# Patient Record
Sex: FEMALE | Race: WHITE | NOT HISPANIC OR LATINO
[De-identification: names, ages, dates, MRNs, and addresses within clinical notes are randomized per-mention and may not be internally consistent; named-entity substitution may affect disease eponyms.]

---

## 2018-01-08 ENCOUNTER — LABORATORY RESULT (OUTPATIENT)
Age: 53
End: 2018-01-08

## 2018-01-09 ENCOUNTER — APPOINTMENT (OUTPATIENT)
Dept: OBGYN | Facility: CLINIC | Age: 53
End: 2018-01-09
Payer: MEDICAID

## 2018-01-09 ENCOUNTER — OUTPATIENT (OUTPATIENT)
Dept: OUTPATIENT SERVICES | Facility: HOSPITAL | Age: 53
LOS: 1 days | End: 2018-01-09
Payer: MEDICAID

## 2018-01-09 VITALS
WEIGHT: 170 LBS | DIASTOLIC BLOOD PRESSURE: 67 MMHG | BODY MASS INDEX: 30.12 KG/M2 | SYSTOLIC BLOOD PRESSURE: 100 MMHG | HEIGHT: 63 IN

## 2018-01-09 DIAGNOSIS — N76.0 ACUTE VAGINITIS: ICD-10-CM

## 2018-01-09 PROCEDURE — G0463: CPT

## 2018-01-09 PROCEDURE — 99396 PREV VISIT EST AGE 40-64: CPT | Mod: NC

## 2018-01-09 PROCEDURE — 87624 HPV HI-RISK TYP POOLED RSLT: CPT

## 2018-01-10 LAB
C TRACH RRNA SPEC QL NAA+PROBE: SIGNIFICANT CHANGE UP
HPV HIGH+LOW RISK DNA PNL CVX: SIGNIFICANT CHANGE UP
N GONORRHOEA RRNA SPEC QL NAA+PROBE: SIGNIFICANT CHANGE UP
SPECIMEN SOURCE: SIGNIFICANT CHANGE UP

## 2018-01-11 LAB — CYTOLOGY SPEC DOC CYTO: SIGNIFICANT CHANGE UP

## 2018-01-12 DIAGNOSIS — Z01.419 ENCOUNTER FOR GYNECOLOGICAL EXAMINATION (GENERAL) (ROUTINE) WITHOUT ABNORMAL FINDINGS: ICD-10-CM

## 2019-02-05 ENCOUNTER — APPOINTMENT (OUTPATIENT)
Dept: OBGYN | Facility: CLINIC | Age: 54
End: 2019-02-05
Payer: MEDICAID

## 2019-02-05 ENCOUNTER — OUTPATIENT (OUTPATIENT)
Dept: OUTPATIENT SERVICES | Facility: HOSPITAL | Age: 54
LOS: 1 days | End: 2019-02-05
Payer: MEDICAID

## 2019-02-05 VITALS
SYSTOLIC BLOOD PRESSURE: 125 MMHG | DIASTOLIC BLOOD PRESSURE: 81 MMHG | BODY MASS INDEX: 32.82 KG/M2 | WEIGHT: 185.25 LBS

## 2019-02-05 DIAGNOSIS — N76.0 ACUTE VAGINITIS: ICD-10-CM

## 2019-02-05 DIAGNOSIS — Z01.419 ENCOUNTER FOR GYNECOLOGICAL EXAMINATION (GENERAL) (ROUTINE) W/OUT ABNORMAL FINDINGS: ICD-10-CM

## 2019-02-05 PROCEDURE — G0463: CPT

## 2019-02-05 PROCEDURE — 99396 PREV VISIT EST AGE 40-64: CPT | Mod: NC

## 2019-02-05 NOTE — PROCEDURE
[Cervical Pap Smear] : cervical Pap smear [Tolerated Poorly] : the patient tolerated the procedure poorly [de-identified] : Pt could not tolerate speculum - sample not collected

## 2019-02-05 NOTE — HISTORY OF PRESENT ILLNESS
[Last Mammogram ___] : Last Mammogram was [unfilled] [Last Colonoscopy ___] : Last colonoscopy [unfilled] [Postmenopausal] : is postmenopausal [Post-Menopause, No Sxs] : post-menopausal, currently without symptoms [Menopause Age: ____] : age at menopause was [unfilled] [Sexually Active] : is sexually active [Monogamous] : is monogamous [Male ___] : [unfilled] male

## 2019-02-07 ENCOUNTER — APPOINTMENT (OUTPATIENT)
Dept: ULTRASOUND IMAGING | Facility: CLINIC | Age: 54
End: 2019-02-07

## 2019-02-08 DIAGNOSIS — Z01.419 ENCOUNTER FOR GYNECOLOGICAL EXAMINATION (GENERAL) (ROUTINE) WITHOUT ABNORMAL FINDINGS: ICD-10-CM

## 2019-02-11 ENCOUNTER — OUTPATIENT (OUTPATIENT)
Dept: OUTPATIENT SERVICES | Facility: HOSPITAL | Age: 54
LOS: 1 days | End: 2019-02-11
Payer: MEDICAID

## 2019-02-11 ENCOUNTER — APPOINTMENT (OUTPATIENT)
Dept: ULTRASOUND IMAGING | Facility: CLINIC | Age: 54
End: 2019-02-11
Payer: MEDICAID

## 2019-02-11 DIAGNOSIS — Z00.8 ENCOUNTER FOR OTHER GENERAL EXAMINATION: ICD-10-CM

## 2019-02-11 PROCEDURE — 76856 US EXAM PELVIC COMPLETE: CPT

## 2019-02-11 PROCEDURE — 76856 US EXAM PELVIC COMPLETE: CPT | Mod: 26

## 2019-02-20 ENCOUNTER — APPOINTMENT (OUTPATIENT)
Dept: PLASTIC SURGERY | Facility: CLINIC | Age: 54
End: 2019-02-20
Payer: MEDICAID

## 2019-02-20 DIAGNOSIS — M79.642 PAIN IN LEFT HAND: ICD-10-CM

## 2019-02-20 PROCEDURE — 99213 OFFICE O/P EST LOW 20 MIN: CPT

## 2019-02-23 NOTE — HISTORY OF PRESENT ILLNESS
[FreeTextEntry1] : Pt presents here today because patient has had left carpal tunnel syndrome for the last six years but has put off surgery because of having children.  She states she has had numbness, tingling and pain for the past six years but the last six months has been worse.  She is now ready to have the surgery.  Pt had right hand carpal tunnel release in 2/2012 and has had no problems with the right hand.

## 2019-02-23 NOTE — REASON FOR VISIT
[Initial Evaluation] : an initial evaluation [FreeTextEntry1] : Pt presents here for Consulation of LT  Hand Pain, Numbness, and Tingling Sensation. She has been feeling Pain for last 6 yrs, however it has been worse the last 6 mnths. Pt states she can't hold objects for more then 2 minutes. Pt states her pain is worse at night, has to wear a Splint for Relief. Pt states she is RT Hand Dominant, She states she has H/x of CT RT hand 02/2012 w/ Dr. Schmidt at [facility]. Pt states she has never done Steroid Injections, Therapy, or Imaging.  Pt is here to discuss Surgical/Treatment Options for LT hand.

## 2019-02-23 NOTE — REVIEW OF SYSTEMS
[Fever] : no fever [Chills] : no chills [Negative] : Heme/Lymph [FreeTextEntry9] : Left hand numbness, tingling and pain [de-identified] : left hand numbness

## 2019-02-23 NOTE — PHYSICAL EXAM
[NI] : Normal [de-identified] : Positive Tinel's and Phalen's at carpal tunnel left hand.  Decreased sensation in the median nerve distribution [de-identified] : as above [de-identified] : decreased sensation in median nerve distribution,

## 2019-05-10 ENCOUNTER — OUTPATIENT (OUTPATIENT)
Dept: OUTPATIENT SERVICES | Facility: HOSPITAL | Age: 54
LOS: 1 days | End: 2019-05-10
Payer: MEDICAID

## 2019-05-10 VITALS
SYSTOLIC BLOOD PRESSURE: 114 MMHG | RESPIRATION RATE: 18 BRPM | TEMPERATURE: 99 F | HEIGHT: 62 IN | WEIGHT: 192.02 LBS | OXYGEN SATURATION: 99 % | HEART RATE: 95 BPM | DIASTOLIC BLOOD PRESSURE: 77 MMHG

## 2019-05-10 DIAGNOSIS — G56.02 CARPAL TUNNEL SYNDROME, LEFT UPPER LIMB: ICD-10-CM

## 2019-05-10 DIAGNOSIS — Z01.818 ENCOUNTER FOR OTHER PREPROCEDURAL EXAMINATION: ICD-10-CM

## 2019-05-10 DIAGNOSIS — Z98.891 HISTORY OF UTERINE SCAR FROM PREVIOUS SURGERY: Chronic | ICD-10-CM

## 2019-05-10 PROCEDURE — 85027 COMPLETE CBC AUTOMATED: CPT

## 2019-05-10 PROCEDURE — G0463: CPT

## 2019-05-10 RX ORDER — LIDOCAINE HCL 20 MG/ML
0.2 VIAL (ML) INJECTION ONCE
Refills: 0 | Status: DISCONTINUED | OUTPATIENT
Start: 2019-05-17 | End: 2019-06-01

## 2019-05-10 RX ORDER — SODIUM CHLORIDE 9 MG/ML
3 INJECTION INTRAMUSCULAR; INTRAVENOUS; SUBCUTANEOUS EVERY 8 HOURS
Refills: 0 | Status: DISCONTINUED | OUTPATIENT
Start: 2019-05-17 | End: 2019-06-01

## 2019-05-10 NOTE — H&P PST ADULT - NSANTHOSAYNRD_GEN_A_CORE
No. WILLARD screening performed.  STOP BANG Legend: 0-2 = LOW Risk; 3-4 = INTERMEDIATE Risk; 5-8 = HIGH Risk

## 2019-05-10 NOTE — H&P PST ADULT - NSICDXPROBLEM_GEN_ALL_CORE_FT
PROBLEM DIAGNOSES  Problem: Left carpal tunnel syndrome  Assessment and Plan: coming in for left carpal tunnel release

## 2019-05-10 NOTE — H&P PST ADULT - DOES THIS PATIENT HAVE A HISTORY OF OR HAS BEEN DX WITH HEART FAILURE?
Active kidney transplant listing letter sent to  Patient  [facility]– [place]  PCP - Dr Webb  
no

## 2019-05-10 NOTE — H&P PST ADULT - NSICDXPASTMEDICALHX_GEN_ALL_CORE_FT
PAST MEDICAL HISTORY:  Anemia     Breech presentation     Carpal tunnel syndrome, bilateral     Gestational diabetes     Obesity

## 2019-05-16 ENCOUNTER — TRANSCRIPTION ENCOUNTER (OUTPATIENT)
Age: 54
End: 2019-05-16

## 2019-05-17 ENCOUNTER — OUTPATIENT (OUTPATIENT)
Dept: OUTPATIENT SERVICES | Facility: HOSPITAL | Age: 54
LOS: 1 days | End: 2019-05-17
Payer: MEDICAID

## 2019-05-17 VITALS
TEMPERATURE: 98 F | HEART RATE: 75 BPM | DIASTOLIC BLOOD PRESSURE: 60 MMHG | RESPIRATION RATE: 15 BRPM | SYSTOLIC BLOOD PRESSURE: 109 MMHG | OXYGEN SATURATION: 96 %

## 2019-05-17 VITALS
DIASTOLIC BLOOD PRESSURE: 74 MMHG | RESPIRATION RATE: 16 BRPM | TEMPERATURE: 98 F | HEART RATE: 76 BPM | OXYGEN SATURATION: 97 % | WEIGHT: 192.02 LBS | SYSTOLIC BLOOD PRESSURE: 119 MMHG | HEIGHT: 62 IN

## 2019-05-17 DIAGNOSIS — Z01.818 ENCOUNTER FOR OTHER PREPROCEDURAL EXAMINATION: ICD-10-CM

## 2019-05-17 DIAGNOSIS — Z98.891 HISTORY OF UTERINE SCAR FROM PREVIOUS SURGERY: Chronic | ICD-10-CM

## 2019-05-17 DIAGNOSIS — G56.02 CARPAL TUNNEL SYNDROME, LEFT UPPER LIMB: ICD-10-CM

## 2019-05-17 PROCEDURE — 64721 CARPAL TUNNEL SURGERY: CPT | Mod: LT

## 2019-05-17 PROCEDURE — 20526 THER INJECTION CARP TUNNEL: CPT | Mod: 59

## 2019-05-17 RX ORDER — ONDANSETRON 8 MG/1
4 TABLET, FILM COATED ORAL ONCE
Refills: 0 | Status: DISCONTINUED | OUTPATIENT
Start: 2019-05-17 | End: 2019-06-01

## 2019-05-17 RX ORDER — CELECOXIB 200 MG/1
200 CAPSULE ORAL ONCE
Refills: 0 | Status: DISCONTINUED | OUTPATIENT
Start: 2019-05-17 | End: 2019-06-01

## 2019-05-17 RX ORDER — SODIUM CHLORIDE 9 MG/ML
1000 INJECTION, SOLUTION INTRAVENOUS
Refills: 0 | Status: DISCONTINUED | OUTPATIENT
Start: 2019-05-17 | End: 2019-06-01

## 2019-05-17 RX ORDER — ACETAMINOPHEN 500 MG
1000 TABLET ORAL ONCE
Refills: 0 | Status: COMPLETED | OUTPATIENT
Start: 2019-05-17 | End: 2019-05-17

## 2019-05-17 RX ORDER — CELECOXIB 200 MG/1
200 CAPSULE ORAL ONCE
Refills: 0 | Status: COMPLETED | OUTPATIENT
Start: 2019-05-17 | End: 2019-05-17

## 2019-05-17 RX ORDER — OXYCODONE HYDROCHLORIDE 5 MG/1
5 TABLET ORAL ONCE
Refills: 0 | Status: DISCONTINUED | OUTPATIENT
Start: 2019-05-17 | End: 2019-05-17

## 2019-05-17 RX ADMIN — Medication 1000 MILLIGRAM(S): at 06:22

## 2019-05-17 RX ADMIN — CELECOXIB 200 MILLIGRAM(S): 200 CAPSULE ORAL at 06:22

## 2019-05-17 NOTE — ASU DISCHARGE PLAN (ADULT/PEDIATRIC) - NURSING INSTRUCTIONS
Next dose of Tylenol will be on or after __12:22 PM_ ,today/tonight, If needed for pain/cramps. Your first dose of Tylenol was given at __6:22 AM__. Do not exceed more than 4000mg of Tylenol in one 24 hour setting.

## 2019-05-17 NOTE — ASU DISCHARGE PLAN (ADULT/PEDIATRIC) - CALL YOUR DOCTOR IF YOU HAVE ANY OF THE FOLLOWING:
Wound/Surgical Site with redness, or foul smelling discharge or pus/Fever greater than (need to indicate Fahrenheit or Celsius)/Swelling that gets worse/Numbness, tingling, color or temperature change to extremity/Pain not relieved by Medications/Nausea and vomiting that does not stop/Bleeding that does not stop Nausea and vomiting that does not stop/Fever greater than (need to indicate Fahrenheit or Celsius)/Unable to urinate/Numbness, tingling, color or temperature change to extremity/Pain not relieved by Medications/Swelling that gets worse/Bleeding that does not stop/Wound/Surgical Site with redness, or foul smelling discharge or pus

## 2019-05-17 NOTE — ASU PATIENT PROFILE, ADULT - PMH
Anemia    Breech presentation    Carpal tunnel syndrome, bilateral    Gestational diabetes    Obesity

## 2019-05-17 NOTE — ASU DISCHARGE PLAN (ADULT/PEDIATRIC) - CARE PROVIDER_API CALL
Gabriel Schmidt)  Plastic Surgery  [address] [place] [address], [address]  [place] [address]  Phone: [phone]  Fax: [phone]  Follow Up Time: 2 weeks

## 2019-05-22 ENCOUNTER — APPOINTMENT (OUTPATIENT)
Dept: PLASTIC SURGERY | Facility: CLINIC | Age: 54
End: 2019-05-22
Payer: MEDICAID

## 2019-05-22 VITALS — HEIGHT: 60 IN

## 2019-05-22 PROCEDURE — 99024 POSTOP FOLLOW-UP VISIT: CPT

## 2019-05-22 NOTE — REASON FOR VISIT
[FreeTextEntry1] : DOS: 05/17/2019 s/p Left carpal tunnel release. Patient c/o mild pain and describes pain level 3/10.

## 2019-05-22 NOTE — PHYSICAL EXAM
[NI] : Normal [de-identified] : Left hand incision healing well no sign of infection no erythema no dehiscence

## 2019-05-22 NOTE — REVIEW OF SYSTEMS
[Fever] : no fever [Chills] : no chills [Negative] : Respiratory [FreeTextEntry9] : Left hand dressing in place

## 2019-06-03 ENCOUNTER — APPOINTMENT (OUTPATIENT)
Dept: PLASTIC SURGERY | Facility: CLINIC | Age: 54
End: 2019-06-03
Payer: MEDICAID

## 2019-06-03 PROCEDURE — 99024 POSTOP FOLLOW-UP VISIT: CPT

## 2019-06-17 ENCOUNTER — APPOINTMENT (OUTPATIENT)
Dept: PLASTIC SURGERY | Facility: CLINIC | Age: 54
End: 2019-06-17
Payer: MEDICAID

## 2019-06-17 DIAGNOSIS — G56.02 CARPAL TUNNEL SYNDROME, LEFT UPPER LIMB: ICD-10-CM

## 2019-06-17 PROCEDURE — 99024 POSTOP FOLLOW-UP VISIT: CPT

## 2019-06-19 NOTE — PHYSICAL EXAM
[NI] : Normal [de-identified] : Left hand incision healing well no sign of infection no erythema no dehiscence.  Sutures removed

## 2019-06-19 NOTE — REASON FOR VISIT
[Follow-Up: _____] : a [unfilled] follow-up visit [FreeTextEntry1] : DOS: 05/17/2019 s/p left carpal tunnel release. Patient is doing better with time.

## 2019-07-07 NOTE — REASON FOR VISIT
[FreeTextEntry1] :  DOS: 05/17/2019 s/p left carpal tunnel release. pt reports some pain/discomfort at times. pt is here for evaluation.

## 2019-07-07 NOTE — PHYSICAL EXAM
[NI] : Normal [de-identified] : Left hand incision healing well no sign of infection no erythema no dehiscence.

## 2020-09-09 DIAGNOSIS — Z00.00 ENCOUNTER FOR GENERAL ADULT MEDICAL EXAMINATION W/OUT ABNORMAL FINDINGS: ICD-10-CM

## 2020-09-30 ENCOUNTER — APPOINTMENT (OUTPATIENT)
Dept: ULTRASOUND IMAGING | Facility: CLINIC | Age: 55
End: 2020-09-30
Payer: MEDICAID

## 2020-09-30 ENCOUNTER — OUTPATIENT (OUTPATIENT)
Dept: OUTPATIENT SERVICES | Facility: HOSPITAL | Age: 55
LOS: 1 days | End: 2020-09-30
Payer: MEDICAID

## 2020-09-30 DIAGNOSIS — Z00.00 ENCOUNTER FOR GENERAL ADULT MEDICAL EXAMINATION WITHOUT ABNORMAL FINDINGS: ICD-10-CM

## 2020-09-30 DIAGNOSIS — Z98.891 HISTORY OF UTERINE SCAR FROM PREVIOUS SURGERY: Chronic | ICD-10-CM

## 2020-09-30 PROCEDURE — 76856 US EXAM PELVIC COMPLETE: CPT

## 2020-09-30 PROCEDURE — 76856 US EXAM PELVIC COMPLETE: CPT | Mod: 26

## 2020-10-22 NOTE — ASU PREOP CHECKLIST - WEIGHT IN LBS
81 yo F w/ a PMHx of HLD, seasonal affective disorder, ICH, pre-DM, and on spironolactone for hair growth, p/w left knee pain after a mechanical fall. Acute nondisplaced transverse fracture of left patella on CT knee. Admitted to [facility] for further management of care.
192

## 2021-09-25 ENCOUNTER — TRANSCRIPTION ENCOUNTER (OUTPATIENT)
Age: 56
End: 2021-09-25

## 2021-11-19 ENCOUNTER — APPOINTMENT (OUTPATIENT)
Dept: MAMMOGRAPHY | Facility: CLINIC | Age: 56
End: 2021-11-19
Payer: MEDICAID

## 2021-11-19 PROCEDURE — 77067 SCR MAMMO BI INCL CAD: CPT

## 2021-11-19 PROCEDURE — 77063 BREAST TOMOSYNTHESIS BI: CPT

## 2022-03-21 NOTE — HISTORY OF PRESENT ILLNESS
Pt updated on plan of care, no new needs at this time, will continue to monitor, pt requested lights dimmed, this RN dimmed lights.    [FreeTextEntry1] : Pt s/p left carpal tunnel release.  Pt doing well no complaints.  Pt states numbness and tingling are much better.

## 2022-05-24 NOTE — ASU DISCHARGE PLAN (ADULT/PEDIATRIC) - SIGNS AND SYMPTOMS OF INFECTION: FEVER, REDNESS, SWELLING, FOUL SMELLING DISCHARGE
Statement Selected Azathioprine Counseling:  I discussed with the patient the risks of azathioprine including but not limited to myelosuppression, immunosuppression, hepatotoxicity, lymphoma, and infections.  The patient understands that monitoring is required including baseline LFTs, Creatinine, possible TPMP genotyping and weekly CBCs for the first month and then every 2 weeks thereafter.  The patient verbalized understanding of the proper use and possible adverse effects of azathioprine.  All of the patient's questions and concerns were addressed.

## 2022-12-02 ENCOUNTER — APPOINTMENT (OUTPATIENT)
Dept: MAMMOGRAPHY | Facility: CLINIC | Age: 57
End: 2022-12-02

## 2022-12-02 PROCEDURE — 77063 BREAST TOMOSYNTHESIS BI: CPT

## 2022-12-02 PROCEDURE — 77067 SCR MAMMO BI INCL CAD: CPT

## 2023-02-10 ENCOUNTER — APPOINTMENT (OUTPATIENT)
Dept: ULTRASOUND IMAGING | Facility: CLINIC | Age: 58
End: 2023-02-10
Payer: MEDICAID

## 2023-02-10 ENCOUNTER — OUTPATIENT (OUTPATIENT)
Dept: OUTPATIENT SERVICES | Facility: HOSPITAL | Age: 58
LOS: 1 days | End: 2023-02-10
Payer: MEDICAID

## 2023-02-10 DIAGNOSIS — Z98.891 HISTORY OF UTERINE SCAR FROM PREVIOUS SURGERY: Chronic | ICD-10-CM

## 2023-02-10 DIAGNOSIS — E78.5 HYPERLIPIDEMIA, UNSPECIFIED: ICD-10-CM

## 2023-02-10 PROCEDURE — 76856 US EXAM PELVIC COMPLETE: CPT

## 2023-02-10 PROCEDURE — 76856 US EXAM PELVIC COMPLETE: CPT | Mod: 26

## 2023-02-12 ENCOUNTER — EMERGENCY (EMERGENCY)
Facility: HOSPITAL | Age: 58
LOS: 1 days | Discharge: HOME | End: 2023-02-12
Attending: EMERGENCY MEDICINE
Payer: MEDICAID

## 2023-02-12 VITALS
OXYGEN SATURATION: 98 % | RESPIRATION RATE: 20 BRPM | HEART RATE: 105 BPM | DIASTOLIC BLOOD PRESSURE: 88 MMHG | TEMPERATURE: 98 F | SYSTOLIC BLOOD PRESSURE: 142 MMHG

## 2023-02-12 VITALS
TEMPERATURE: 98 F | SYSTOLIC BLOOD PRESSURE: 161 MMHG | DIASTOLIC BLOOD PRESSURE: 80 MMHG | OXYGEN SATURATION: 99 % | HEIGHT: 62 IN | HEART RATE: 102 BPM | WEIGHT: 177.91 LBS | RESPIRATION RATE: 18 BRPM

## 2023-02-12 DIAGNOSIS — Z98.891 HISTORY OF UTERINE SCAR FROM PREVIOUS SURGERY: Chronic | ICD-10-CM

## 2023-02-12 PROCEDURE — 73562 X-RAY EXAM OF KNEE 3: CPT

## 2023-02-12 PROCEDURE — 73630 X-RAY EXAM OF FOOT: CPT

## 2023-02-12 PROCEDURE — 73590 X-RAY EXAM OF LOWER LEG: CPT | Mod: 26,LT

## 2023-02-12 PROCEDURE — 27788 TREATMENT OF ANKLE FRACTURE: CPT | Mod: LT

## 2023-02-12 PROCEDURE — 28475 CLTX METATARSAL FX W/MNPJ EA: CPT | Mod: LT

## 2023-02-12 PROCEDURE — 73562 X-RAY EXAM OF KNEE 3: CPT | Mod: 26,LT

## 2023-02-12 PROCEDURE — 73610 X-RAY EXAM OF ANKLE: CPT | Mod: 26,LT

## 2023-02-12 PROCEDURE — 99285 EMERGENCY DEPT VISIT HI MDM: CPT

## 2023-02-12 PROCEDURE — 73610 X-RAY EXAM OF ANKLE: CPT

## 2023-02-12 PROCEDURE — 73590 X-RAY EXAM OF LOWER LEG: CPT

## 2023-02-12 PROCEDURE — 99285 EMERGENCY DEPT VISIT HI MDM: CPT | Mod: 25

## 2023-02-12 PROCEDURE — 73610 X-RAY EXAM OF ANKLE: CPT | Mod: 26,LT,77

## 2023-02-12 PROCEDURE — 73630 X-RAY EXAM OF FOOT: CPT | Mod: 26,LT

## 2023-02-12 RX ORDER — ALPRAZOLAM 0.25 MG
0.5 TABLET ORAL ONCE
Refills: 0 | Status: DISCONTINUED | OUTPATIENT
Start: 2023-02-12 | End: 2023-02-12

## 2023-02-12 RX ORDER — OXYCODONE HYDROCHLORIDE 5 MG/1
1 TABLET ORAL
Qty: 9 | Refills: 0
Start: 2023-02-12 | End: 2023-02-14

## 2023-02-12 RX ORDER — OXYCODONE HYDROCHLORIDE 5 MG/1
5 TABLET ORAL ONCE
Refills: 0 | Status: DISCONTINUED | OUTPATIENT
Start: 2023-02-12 | End: 2023-02-12

## 2023-02-12 RX ADMIN — Medication 0.5 MILLIGRAM(S): at 17:37

## 2023-02-12 RX ADMIN — OXYCODONE HYDROCHLORIDE 5 MILLIGRAM(S): 5 TABLET ORAL at 15:50

## 2023-02-12 RX ADMIN — OXYCODONE HYDROCHLORIDE 5 MILLIGRAM(S): 5 TABLET ORAL at 14:34

## 2023-02-12 NOTE — ED PROVIDER NOTE - CLINICAL SUMMARY MEDICAL DECISION MAKING FREE TEXT BOX
57yoF w/traumatic ankle injury s/p fall off stepladder at home today. VS unremarkable, phys exam w/tenderness L ankle w/o knee or proximal tib/fib tenderness. XRs at urgent care showed "transverse fracture through the proximal diaphysis of the fifth metatarsus" and "transverse fracture through the lateral malleolus" of L foot, will repeat ankle/foot XR along w/tib-fib and knee to assess for maisonneuve fx, likely DCTH w/ortho f/u. - Tyler Dash, PGY-2

## 2023-02-12 NOTE — ED PROVIDER NOTE - CARE PLAN
Principal Discharge DX:	Ankle fracture   1 Principal Discharge DX:	Fracture of malleolus of left ankle

## 2023-02-12 NOTE — ED PROVIDER NOTE - ATTENDING CONTRIBUTION TO CARE
Attending MD Godfrey: I personally have seen and examined this patient.  Resident note reviewed and agree on plan of care and except where noted.  See below for details.     seen in [place] [facility], accompanied by     57F with no reported contributory PMH/PSH/Meds, no known drug allergies presents to the ED with L foot and ankle fracture diagnosed at Urgent Care.  Reports that this morning fell a few rungs off a ladder and broke fall with L ankle.  Report from XR reveals LEFT "transverse fracture through the proximal diaphysis of the fifth metatarsus" and "transverse fracture through the lateral malleolus".  Reports pain at L foot and ankle.  Denies other physical injury including chest pain, shortness of breath, abdominal pain, nausea, vomiting, diarrhea, urinary complaints. Denies preceding dizziness, weakness, sensory changes.  Denies LOC, hitting head.      Exam:   General: NAD  HENT: head NCAT, airway patent   Chest: symmetric chest rise, no increased work of breathing  MSK: ranging neck freely, able to move toes on L, +tenderness to L ankle, no tenderness to proximal lower leg, knee nontender, cap refill <2s, compartments soft  Neuro: moving all extremities spontaneously, sensory grossly intact, no gross neuro deficits  Psych: normal mood and affect     A/P: 57F with outpatient L foot and ankle fracture, will repeat films for concern for bony injury, will consult ortho and splint as needed

## 2023-02-12 NOTE — ED PROVIDER NOTE - NS ED ROS FT
GENERAL: No fever or chills, EYES: no change in vision, HEENT: no trouble swallowing or speaking, CARDIAC: no chest pain, PULMONARY: no cough or SOB, GI: no abdominal pain, no nausea, no vomiting, no diarrhea or constipation, : No changes in urination, SKIN: no rashes, NEURO: no headache,  MSK: +L ankle pain    All other ROS negative unless otherwise specified in HPI.     ~Tyler Dash M.D. Resident

## 2023-02-12 NOTE — ED PROVIDER NOTE - CARE PROVIDER_API CALL
Henry Hodges)  Orthopedics  [address] [place] [address]  Phone: [phone]  Fax: [phone]  Follow Up Time: 4-6 Days

## 2023-02-12 NOTE — ED ADULT NURSE NOTE - OBJECTIVE STATEMENT
58 y/o F with no significant PMHx presents to the ED with complaints of L foot/ankle pain s/p fall. Patient states she was going up a ladder and fell coming down as she had multiple things in her hands. Patient states she felt pain to foot/ankle 10/10 in severity following fall and was unable to get up immediately. Notes she was on the ground for approximately 15 minutes before getting up. Patient was seen at urgent care at which point recommended ED evaluation due to fracture. Patient denies fever, chills, headache, dizziness. AOx4 and speaking coherently with  at bedside. Breathing is unlabored, spontaneous, and symmetrical. Abdomen and bladder are nondistended. No peripheral edema. <2s capillary refill. Limited ROM of LLE secondary to pain.

## 2023-02-12 NOTE — ED PROVIDER NOTE - PHYSICAL EXAMINATION
Gen: AAOx3, non-toxic WDWN middle-aged woman lying in bed in NAD  Head: NCAT  HEENT: EOMI, oral mucosa moist, normal conjunctiva  Lung: CTAB, no respiratory distress, no wheezes/rhonchi/rales B/L, speaking in full sentences  CV: RRR, no murmurs, rubs or gallops  Abd: soft, NTND, no guarding, no CVA tenderness  MSK: +L ankle ttp through splint, good cap refill <2 sec, able to range toes, sensation intact; no L tib/fib/knee   Neuro: No focal sensory or motor deficits  Skin: Warm, well perfused, no rash, no edema  Psych: anxious, normal affect.  ~Tyler Dash M.D. Resident

## 2023-02-12 NOTE — ED ADULT NURSE REASSESSMENT NOTE - NS ED NURSE REASSESS COMMENT FT1
Patient notes "pain is ok if they leave me alone." States pain 7/10 in severity with movement. Foot/ankle wrapped and elevated.

## 2023-02-12 NOTE — ED PROVIDER NOTE - PROGRESS NOTE DETAILS
Ortho consulted, pt splinted in ED by ortho, recommend outpt f/u w/[facility], crutches as pt has to be nonweightbearing. - Tyler Dash, PGY-2 Attending MD Godfrey: Seen and splinted by ortho.  Patient is to be non weight bearing.  Pending repeat XR. Post-reduction XR OK for DCTH per ortho. crutches and crutch training completed. Pt stable, requesting discharge. Discussed results of workup, importance of follow-up with ortho, otc meds and oxy for pain, and return precautions with patient who verbalized understanding and agreement. Pt had opportunity to ask questions and address concerns, and results of workup including imaging were provided in DC paperwork. Patient is medically clear for DC at this time. -Tyler Dash, PGY-2

## 2023-02-12 NOTE — ED PROVIDER NOTE - NSFOLLOWUPINSTRUCTIONS_ED_ALL_ED_FT
Please follow up with an orthopedic surgeon within 1 week regarding your ANKLE FRACTURE. Bring copies of your results with you (provided in your discharge paperwork). DO NOT BEAR ANY WEIGHT ON YOUR ANKLE. USE CRUTCHES UNTIL TOLD OTHERWISE BY YOUR ORTHOPEDIC DOCTOR.    You may take 500-1000 mg acetaminophen (tylenol) every 6 hours, as needed for pain.  You may take 600 mg ibuprofen every 8 hours, with food, as needed for pain.  You can take tylenol and ibuprofen at the same time.     WHAT YOU NEED TO KNOW:  An ankle fracture is a break in 1 or more of the bones in your ankle.    DISCHARGE INSTRUCTIONS:    Call your local emergency number (911 in the [place]) for any of the following:   •You feel lightheaded, short of breath, and have chest pain.  •You cough up blood.    Return to the emergency department if:   •Your leg feels warm, tender, and painful. It may look swollen and red.  •Blood soaks through your bandage.  •You have severe pain in your ankle.  •Your cast feels too tight.  •Your foot or toes are cold or numb.  •Your foot or toenails turn blue or gray.    Call your doctor if:   •Your splint feels too tight.  •Your swelling has increased or returned.  •You have a fever.  •Your pain does not go away, even after treatment.  •You have questions or concerns about your condition or care.    Medicines: You may need any of the following:   •Acetaminophen decreases pain and fever. It is available without a doctor's order. Ask how much to take and how often to take it. Follow directions. Read the labels of all other medicines you are using to see if they also contain acetaminophen, or ask your doctor or pharmacist. Acetaminophen can cause liver damage if not taken correctly.  •NSAIDs, such as ibuprofen, help decrease swelling, pain, and fever. This medicine is available with or without a doctor's order. NSAIDs can cause stomach bleeding or kidney problems in certain people. If you take blood thinner medicine, always ask your healthcare provider if NSAIDs are safe for you. Always read the medicine label and follow directions.  •Prescription pain medicine may be given. Ask your healthcare provider how to take this medicine safely. Some prescription pain medicines contain acetaminophen. Do not take other medicines that contain acetaminophen without talking to your healthcare provider. Too much acetaminophen may cause liver damage. Prescription pain medicine may cause constipation. Ask your healthcare provider how to prevent or treat constipation.   •Take your medicine as directed. Contact your healthcare provider if you think your medicine is not helping or if you have side effects. Tell your provider if you are allergic to any medicine. Keep a list of the medicines, vitamins, and herbs you take. Include the amounts, and when and why you take them. Bring the list or the pill bottles to follow-up visits. Carry your medicine list with you in case of an emergency.    Self-care:   •Rest your ankle so that it can heal. Return to normal activities as directed.  •Apply ice on your ankle for 15 to 20 minutes every hour or as directed. Use an ice pack, or put crushed ice in a plastic bag. Cover it with a towel. Ice helps prevent tissue damage and decreases swelling and pain.  •Use a support device, such as a brace, cast, or splint to limit your movement and protect your ankle. You may need to use crutches to protect your ankle and decrease your pain as you move around. Do not remove your device and do not put weight on your injured ankle.  •Elevate your ankle above the level of your heart as often as you can. This will help decrease swelling and pain. Prop your ankle on pillows or blankets to keep it elevated comfortably.    Splint and cast care: Cover the splint or cast before you bathe so it does not get wet. Tape 2 plastic trash bags to your skin above the cast. Try to keep your ankle out of the water as much as possible. DO NOT GET YOUR SPLINT WET.     Follow up with your doctor in 1 to 2 days, or as directed: Your fracture may need to be reduced (bones pushed back into place) or you may need surgery. Write down your questions so you remember to ask them during your visits. Please follow up with an orthopedic surgeon within 1 week regarding your ANKLE FRACTURE. Bring copies of your results with you (provided in your discharge paperwork). DO NOT BEAR ANY WEIGHT ON YOUR ANKLE. USE CRUTCHES UNTIL TOLD OTHERWISE BY YOUR ORTHOPEDIC DOCTOR.    YOU ARE BEING GIVEN THE CONTACT INFORMATION FOR DR. SCHMID PLEASE CALL TO SET UP AN APPOINTMENT WITH HIM OR WITH YOUR ORTHOPEDIST OF CHOICE WITHIN A WEEK.      AGAIN, DO NOT WEIGHT BEAR (DO NOT PUT ANY WEIGHT ON YOUR LEFT FOOT OR ANKLE) UNTIL YOU ARE INSTRUCTED TO DO SO BY AN ORTHOPEDIST.    You may take 500-1000 mg acetaminophen (tylenol) every 6 hours, as needed for pain.  You may take 600 mg ibuprofen every 8 hours, with food, as needed for pain.  You can take tylenol and ibuprofen at the same time.     WHAT YOU NEED TO KNOW:  An ankle fracture is a break in 1 or more of the bones in your ankle.    DISCHARGE INSTRUCTIONS:    Call your local emergency number (911 in the [place]) for any of the following:   •You feel lightheaded, short of breath, and have chest pain.  •You cough up blood.    Return to the emergency department if:   •Your leg feels warm, tender, and painful. It may look swollen and red.  •Blood soaks through your bandage.  •You have severe pain in your ankle.  •Your cast feels too tight.  •Your foot or toes are cold or numb.  •Your foot or toenails turn blue or gray.    Call your doctor if:   •Your splint feels too tight.  •Your swelling has increased or returned.  •You have a fever.  •Your pain does not go away, even after treatment.  •You have questions or concerns about your condition or care.    Medicines: You may need any of the following:   •Acetaminophen decreases pain and fever. It is available without a doctor's order. Ask how much to take and how often to take it. Follow directions. Read the labels of all other medicines you are using to see if they also contain acetaminophen, or ask your doctor or pharmacist. Acetaminophen can cause liver damage if not taken correctly.  •NSAIDs, such as ibuprofen, help decrease swelling, pain, and fever. This medicine is available with or without a doctor's order. NSAIDs can cause stomach bleeding or kidney problems in certain people. If you take blood thinner medicine, always ask your healthcare provider if NSAIDs are safe for you. Always read the medicine label and follow directions.  •Prescription pain medicine may be given. Ask your healthcare provider how to take this medicine safely. Some prescription pain medicines contain acetaminophen. Do not take other medicines that contain acetaminophen without talking to your healthcare provider. Too much acetaminophen may cause liver damage. Prescription pain medicine may cause constipation. Ask your healthcare provider how to prevent or treat constipation.   •Take your medicine as directed. Contact your healthcare provider if you think your medicine is not helping or if you have side effects. Tell your provider if you are allergic to any medicine. Keep a list of the medicines, vitamins, and herbs you take. Include the amounts, and when and why you take them. Bring the list or the pill bottles to follow-up visits. Carry your medicine list with you in case of an emergency.    Self-care:   •Rest your ankle so that it can heal. Return to normal activities as directed.  •Apply ice on your ankle for 15 to 20 minutes every hour or as directed. Use an ice pack, or put crushed ice in a plastic bag. Cover it with a towel. Ice helps prevent tissue damage and decreases swelling and pain.  •Use a support device, such as a brace, cast, or splint to limit your movement and protect your ankle. You may need to use crutches to protect your ankle and decrease your pain as you move around. Do not remove your device and do not put weight on your injured ankle.  •Elevate your ankle above the level of your heart as often as you can. This will help decrease swelling and pain. Prop your ankle on pillows or blankets to keep it elevated comfortably.    Splint and cast care: Cover the splint or cast before you bathe so it does not get wet. Tape 2 plastic trash bags to your skin above the cast. Try to keep your ankle out of the water as much as possible. DO NOT GET YOUR SPLINT WET.     Follow up with your doctor in 1 to 2 days, or as directed: Your fracture may need to be reduced (bones pushed back into place) or you may need surgery. Write down your questions so you remember to ask them during your visits. Please follow up with an orthopedic surgeon within 1 week regarding your ANKLE FRACTURE. Bring copies of your results with you (provided in your discharge paperwork). DO NOT BEAR ANY WEIGHT ON YOUR ANKLE. USE CRUTCHES UNTIL TOLD OTHERWISE BY YOUR ORTHOPEDIC DOCTOR.    YOU ARE BEING GIVEN THE CONTACT INFORMATION FOR DR. SCHMID PLEASE CALL TO SET UP AN APPOINTMENT WITH HIM OR WITH YOUR ORTHOPEDIST OF CHOICE WITHIN A WEEK.      AGAIN, DO NOT WEIGHT BEAR (DO NOT PUT ANY WEIGHT ON YOUR LEFT FOOT OR ANKLE) UNTIL YOU ARE INSTRUCTED TO DO SO BY AN ORTHOPEDIST.    You may take 500-1000 mg acetaminophen (tylenol) every 6 hours, as needed for pain.  You may take 600 mg ibuprofen every 8 hours, with food, as needed for pain.  You can take tylenol and ibuprofen at the same time.     IF YOUR PAIN DOES NOT RESPOND TO TYLENOL/IBUPROFEN, PLEASE TAKE THE FOLLOWING MEDICATION AS PRESCRIBED: oxycodone, 1 tab, every 8 hours, AS NEEDED for severe pain. This is an opiate (narcotic) medication. Please do not drink alcohol, drive, or operate heavy machinery while taking this medication.     WHAT YOU NEED TO KNOW:  An ankle fracture is a break in 1 or more of the bones in your ankle.    DISCHARGE INSTRUCTIONS:    Call your local emergency number (911 in the [place]) for any of the following:   •You feel lightheaded, short of breath, and have chest pain.  •You cough up blood.    Return to the emergency department if:   •Your leg feels warm, tender, and painful. It may look swollen and red.  •Blood soaks through your bandage.  •You have severe pain in your ankle.  •Your cast feels too tight.  •Your foot or toes are cold or numb.  •Your foot or toenails turn blue or gray.    Call your doctor if:   •Your splint feels too tight.  •Your swelling has increased or returned.  •You have a fever.  •Your pain does not go away, even after treatment.  •You have questions or concerns about your condition or care.    Medicines: You may need any of the following:   •Acetaminophen decreases pain and fever. It is available without a doctor's order. Ask how much to take and how often to take it. Follow directions. Read the labels of all other medicines you are using to see if they also contain acetaminophen, or ask your doctor or pharmacist. Acetaminophen can cause liver damage if not taken correctly.  •NSAIDs, such as ibuprofen, help decrease swelling, pain, and fever. This medicine is available with or without a doctor's order. NSAIDs can cause stomach bleeding or kidney problems in certain people. If you take blood thinner medicine, always ask your healthcare provider if NSAIDs are safe for you. Always read the medicine label and follow directions.  •Prescription pain medicine may be given. Ask your healthcare provider how to take this medicine safely. Some prescription pain medicines contain acetaminophen. Do not take other medicines that contain acetaminophen without talking to your healthcare provider. Too much acetaminophen may cause liver damage. Prescription pain medicine may cause constipation. Ask your healthcare provider how to prevent or treat constipation.   •Take your medicine as directed. Contact your healthcare provider if you think your medicine is not helping or if you have side effects. Tell your provider if you are allergic to any medicine. Keep a list of the medicines, vitamins, and herbs you take. Include the amounts, and when and why you take them. Bring the list or the pill bottles to follow-up visits. Carry your medicine list with you in case of an emergency.    Self-care:   •Rest your ankle so that it can heal. Return to normal activities as directed.  •Apply ice on your ankle for 15 to 20 minutes every hour or as directed. Use an ice pack, or put crushed ice in a plastic bag. Cover it with a towel. Ice helps prevent tissue damage and decreases swelling and pain.  •Use a support device, such as a brace, cast, or splint to limit your movement and protect your ankle. You may need to use crutches to protect your ankle and decrease your pain as you move around. Do not remove your device and do not put weight on your injured ankle.  •Elevate your ankle above the level of your heart as often as you can. This will help decrease swelling and pain. Prop your ankle on pillows or blankets to keep it elevated comfortably.    Splint and cast care: Cover the splint or cast before you bathe so it does not get wet. Tape 2 plastic trash bags to your skin above the cast. Try to keep your ankle out of the water as much as possible. DO NOT GET YOUR SPLINT WET.     Follow up with your doctor in 1 to 2 days, or as directed: Your fracture may need to be reduced (bones pushed back into place) or you may need surgery. Write down your questions so you remember to ask them during your visits.

## 2023-02-12 NOTE — ED PROVIDER NOTE - PATIENT PORTAL LINK FT
You can access the FollowMyHealth Patient Portal offered by [facility] by registering at the following website: [email or web address]. By joining [facility]’s FollowMyHealth portal, you will also be able to view your health information using other applications (apps) compatible with our system.

## 2023-02-12 NOTE — CONSULT NOTE ADULT - SUBJECTIVE AND OBJECTIVE BOX
57y Female presents with left 5th metatarsal fracture and michelle A ankle fracture. S/p fall today. Denies numbness/tingling in the affected extremity. Denies head strike/LOC/other orthopedic injuries at this time. ambulates without assistance at baseline.    PAST MEDICAL & SURGICAL HISTORY:  Anemia      Carpal tunnel syndrome, bilateral      Obesity      Breech presentation      Gestational diabetes      S/P carpal tunnel release  right hand       History of   2013        Home Medications:    Allergies    No Known Allergies    Intolerances                        Vital Signs Last 24 Hrs  T(C): 36.3 (2023 14:38), Max: 36.8 (2023 12:45)  T(F): 97.4 (2023 14:38), Max: 98.2 (2023 12:45)  HR: 91 (2023 14:38) (91 - 102)  BP: 135/83 (2023 14:38) (135/83 - 161/80)  BP(mean): --  RR: 18 (2023 14:38) (18 - 18)  SpO2: 96% (2023 14:38) (96% - 99%)    Parameters below as of 2023 14:38  Patient On (Oxygen Delivery Method): room air        PHYSICAL EXAM  General: NAD, Awake and Alert      LLE:  skin intact  TTP about 5th met and lateral mal  NTTP about medial mal  NTTP proximal to ankle  +sensation L2-S1  +dorsiflexion/plantarflexion of ankle/hallux  +dorsalis pedis pulse  Soft compartments, - calf tenderness      Secondary Exam: Benign, Skin intact, NTTP along axial spine, SILT throughout, motor grossly intact throughout, no other orthopedic injuries at this time, compartments soft and compressible    ankle stress performed no gapping of medial mal clear space and no pain with stress      Procedure:  Procedure explained and discussed risks, benefits, and alternatives to procedure with patient at bedside. Patient expressed understanding and all questions were answered. Closed reduction was performed and a well molded plaster splint was applied. The patient tolerated the procedure well and there we no complications. The patient was neurovascularly intact following reduction. Post-reduction x-rays demonstrated acceptable alignment.      IMAGING:  XR : 5th met zone 2 jorgensen fx, michelle A ankle fx  CT :    Assessment/Plan:  57y Female with 5th met zone 2 jorgensen fx, michelle A ankle fx    -Placed in trilam AO ankle splint  -NWB LLE  -crutches for ambulation   -Pain control as needed  -f/u outpatient with Dr. monet in 10 days  -Keep splint clean dry intact   -explained may need ORIF of 5th met if no healing with non-op mgmt   -No acute orthopedic surgical intervention needed at this time  -Discussed with attending who agrees with plan  -Ortho stable for discharge

## 2023-02-12 NOTE — ED PROVIDER NOTE - OBJECTIVE STATEMENT
Pt is a 57yoF w/no PMHx p/w acute ankle fracture. Pt was on stepladder at home holding boxes when her skirt got caught, causing her to fall off ladder, broke fall w/L ankle, was unable to bear weight on ankle so she went to urgent care. XRs at urgent care showed "transverse fracture through the proximal diaphysis of the fifth metatarsus" and "transverse fracture through the lateral malleolus" of L foot, sent here to see "foot surgeon." Denies HA, SOB, CP, back pain, abd pain, n/v/d/c, numbness/tingling, generalized weakness, fever/chills.

## 2023-02-12 NOTE — ED ADULT NURSE NOTE - NSIMPLEMENTINTERV_GEN_ALL_ED
Implemented All Fall Risk Interventions:  [place] to call system. Call bell, personal items and telephone within reach. Instruct patient to call for assistance. Room bathroom lighting operational. Non-slip footwear when patient is off stretcher. Physically safe environment: no spills, clutter or unnecessary equipment. Stretcher in lowest position, wheels locked, appropriate side rails in place. Provide visual cue, wrist band, yellow gown, etc. Monitor gait and stability. Monitor for mental status changes and reorient to person, place, and time. Review medications for side effects contributing to fall risk. Reinforce activity limits and safety measures with patient and family.

## 2023-02-16 ENCOUNTER — NON-APPOINTMENT (OUTPATIENT)
Age: 58
End: 2023-02-16

## 2023-02-17 ENCOUNTER — NON-APPOINTMENT (OUTPATIENT)
Age: 58
End: 2023-02-17

## 2023-02-17 ENCOUNTER — APPOINTMENT (OUTPATIENT)
Dept: ORTHOPEDIC SURGERY | Facility: CLINIC | Age: 58
End: 2023-02-17
Payer: MEDICAID

## 2023-02-17 VITALS
HEIGHT: 62 IN | SYSTOLIC BLOOD PRESSURE: 140 MMHG | TEMPERATURE: 97.2 F | DIASTOLIC BLOOD PRESSURE: 80 MMHG | HEART RATE: 93 BPM | BODY MASS INDEX: 32.76 KG/M2 | OXYGEN SATURATION: 98 % | WEIGHT: 178 LBS

## 2023-02-17 DIAGNOSIS — S99.929A UNSPECIFIED INJURY OF UNSPECIFIED FOOT, INITIAL ENCOUNTER: ICD-10-CM

## 2023-02-17 PROCEDURE — 28470 CLTX METATARSAL FX WO MNP EA: CPT | Mod: LT

## 2023-02-17 PROCEDURE — 73630 X-RAY EXAM OF FOOT: CPT | Mod: LT

## 2023-02-17 PROCEDURE — 99203 OFFICE O/P NEW LOW 30 MIN: CPT

## 2023-02-17 PROCEDURE — 73610 X-RAY EXAM OF ANKLE: CPT | Mod: LT

## 2023-02-17 NOTE — REASON FOR VISIT
Instructions  Change your dressing every day.    Follow up in wound clinic in 3-4 weeks.     If you have questions or concerns about your wounds/wound care, please call the Wound Clinic at [phone].    1. Wash your hands with liquid soap and water or hand .  2. Set up the supplies needed for your wound care and dressing change on a clean surface.  3. Remove old/soiled dressing and discard, touching only the corners or top of the dressing, avoid touching the inside of the dressing.  4. Removal all wound packing and dressings to prevent an infection.  5. Repeat washing hands with liquid soap or hand .  6. Wash your wound with water using a clean washcloth, paper towel or gauze; moisten with water clean wound by wiping over the area. Use another clean paper towel, gauze or area of clean washcloth, clean area around the wound.    If you are able to shower, remove old dressing prior to showering. Cleanse your wound with water while you are in the shower.    WOUND TREATMENT  Apply the following to your wound bed Silvasorb and Mupirocin antibiotic ointment.    Cover/wrap with gauze, bandage, tape, compression stocking.      Call your healthcare provider immediately for the following symptoms:    · Shaking chills or fever greater than 100.4 degrees Farenheit  · Bleeding that soaks the dressing  · Stitches that are pulling away from the wound or pulling apart  · Increased drainage from the wound or drainage that is yellow-green, or smelly  · Increased swelling, pain, or redness in the skin around the wound  · Increased size of the wound  · Increased fatigue  · Loss of appetite     [Initial Visit] : an initial visit for [FreeTextEntry2] : left foot and ankle injuries

## 2023-02-17 NOTE — PHYSICAL EXAM
[Slightly Antalgic] : slightly antalgic [Wheelchair] : uses a wheelchair [LE] : Sensory: Intact in bilateral lower extremities [DP] : dorsalis pedis 2+ and symmetric bilaterally [PT] : posterior tibial 2+ and symmetric bilaterally [Normal] : Alert and in no acute distress [Poor Appearance] : well-appearing [Acute Distress] : not in acute distress [de-identified] : The patient has no respiratory distress. Mood and affect are normal. The patient is alert and oriented to person, place and time.\par There is no pain with knee motion.  Examination of the left foot and ankle demonstrates no tenderness of the ankle.  There is tenderness and swelling on the lateral border of the left midfoot.  The skin is intact.  There is mild ecchymosis.  Sensation is intact. [de-identified] : \par ACC: [id number] EXAM: XR KNEE AP LAT OBL 3 VIEWS LT ORDERED BY: THOR VARGHESE\par \par ACC: [id number] EXAM: XR TIB FIB AP LAT 2 VIEWS LT ORDERED BY: THOR VARGHESE\par \par PROCEDURE DATE: 02/12/2023\par INTERPRETATION: INDICATION: Trauma to the left lower extremity.\par \par TECHNIQUE: AP, oblique, and lateral views of the left knee and AP and lateral views left tibia and fibula\par \par FINDINGS: No fracture or dislocation is seen in the left knee. There is moderate patellofemoral arthrosis. There is no joint effusion in the left knee or prepatellar soft tissue swelling.\par \par There are small calcifications abutting the distal aspect of the left medial and lateral malleoli which may represent small avulsion fractures. Otherwise, no other fracture is seen in the left tibia and fibula.\par \par IMPRESSION: Mild osteoarthrosis of the left knee. No fracture or dislocation of the left knee.\par Questionable small avulsion fractures of the left medial and lateral malleoli. No other fracture is seen in the left tibia and fibula.\par \par --- End of Report ---\par \par ASHLYN HERNÁNDEZ MD; Attending Radiologist\par This document has been electronically signed. Feb 12 2023 2:31PM\par \par \par AP, lateral and mortise x-rays of the left ankle taken today demonstrate no fracture or dislocation.\par There is a small calcification at the tip of the lateral malleolus but there is no tenderness on exam.\par AP, lateral and oblique x-rays of the left foot demonstrate proximal fifth metatarsal fracture at the diaphyseal metaphyseal junction.

## 2023-02-17 NOTE — DISCUSSION/SUMMARY
[de-identified] : The patient has a fracture of the left fifth metatarsal in the metaphyseal diaphyseal junction.  I think that this fracture would be best immobilized in a short leg cast.  I had a lengthy discussion with the patient and her  who do not feel that she can tolerate nonweightbearing in a cast.  Alternatively she will be placed in a cam boot.  I have encouraged her to keep the boot on the vast majority of the day and to not ambulate on her foot.  If she needs to ambulate it must be done with the cam boot in place.  She understands that there may be a greater chance of delayed or nonunion without the cast immobilization.  She will be reevaluated in 3 weeks.

## 2023-03-13 ENCOUNTER — APPOINTMENT (OUTPATIENT)
Dept: ORTHOPEDIC SURGERY | Facility: CLINIC | Age: 58
End: 2023-03-13
Payer: MEDICAID

## 2023-03-13 VITALS
HEIGHT: 62 IN | BODY MASS INDEX: 32.76 KG/M2 | SYSTOLIC BLOOD PRESSURE: 138 MMHG | OXYGEN SATURATION: 98 % | HEART RATE: 102 BPM | DIASTOLIC BLOOD PRESSURE: 81 MMHG | TEMPERATURE: 97.7 F | WEIGHT: 178 LBS

## 2023-03-13 PROCEDURE — 73630 X-RAY EXAM OF FOOT: CPT | Mod: LT

## 2023-03-13 PROCEDURE — 99024 POSTOP FOLLOW-UP VISIT: CPT

## 2023-03-13 NOTE — HISTORY OF PRESENT ILLNESS
[de-identified] : The patient presents for reevaluation of left fifth metatarsal fracture, 4 weeks from injury. She has been immobilized in a cam boot, has not been weightbearing. She has no pain. She has no new complaints.  She is not happy with the cam boot and prefers if we would allow her to walk unaided.

## 2023-03-13 NOTE — DISCUSSION/SUMMARY
[de-identified] : The patient has a healing fracture of the left fifth metatarsal.  I again explained to the patient that this fracture is at risk because of its anatomic location in the fifth metatarsal.  The patient needs continued immobilization in the cam boot.  The patient would like to have the boot off but I have recommended that that would be a mistake.  She did insist on having a shorter cam boot as she was uncomfortable in the tall cam boot.  This fracture remains at risk to delayed or nonunion.  She will be reevaluated in 3 weeks.

## 2023-03-13 NOTE — PHYSICAL EXAM
[Slightly Antalgic] : slightly antalgic [Wheelchair] : uses a wheelchair [LE] : Sensory: Intact in bilateral lower extremities [DP] : dorsalis pedis 2+ and symmetric bilaterally [PT] : posterior tibial 2+ and symmetric bilaterally [Normal] : Alert and in no acute distress [Poor Appearance] : well-appearing [Acute Distress] : not in acute distress [de-identified] : The patient has no respiratory distress. Mood and affect are normal. The patient is alert and oriented to person, place and time.\par There is no pain with knee motion.  Examination of the left foot and ankle demonstrates no tenderness of the ankle.  There is tenderness and no swelling on the lateral border of the left midfoot.  The skin is intact.  Sensation is intact. [de-identified] : AP, lateral and oblique x-rays of the left foot taken today demonstrate fifth metatarsal fracture which is well aligned.  Fracture line is still visible.

## 2023-04-03 ENCOUNTER — APPOINTMENT (OUTPATIENT)
Dept: ORTHOPEDIC SURGERY | Facility: CLINIC | Age: 58
End: 2023-04-03
Payer: MEDICAID

## 2023-04-03 VITALS
HEIGHT: 62 IN | DIASTOLIC BLOOD PRESSURE: 75 MMHG | HEART RATE: 89 BPM | WEIGHT: 178 LBS | TEMPERATURE: 97 F | SYSTOLIC BLOOD PRESSURE: 112 MMHG | BODY MASS INDEX: 32.76 KG/M2 | OXYGEN SATURATION: 98 %

## 2023-04-03 PROCEDURE — 99024 POSTOP FOLLOW-UP VISIT: CPT

## 2023-04-03 PROCEDURE — 73630 X-RAY EXAM OF FOOT: CPT | Mod: LT

## 2023-04-03 NOTE — DISCUSSION/SUMMARY
[de-identified] : The patient's left fifth metatarsal fracture is healing.  It is not completely healed yet.  She will try to move from the cam boot into regular shoes.  If comfortable she is permitted walking in the regular shoe.  If pain worsen she should go back into the boot.  I would like to reevaluate her and repeat the x-rays in 3 weeks.

## 2023-04-03 NOTE — HISTORY OF PRESENT ILLNESS
[de-identified] : The patient presents for reevaluation of left fifth metatarsal fracture, 7 weeks from injury. She continues to be immobilized in a cam boot, using a cane for assistance.  She is walking comfortably without pain. No new complaints.

## 2023-04-03 NOTE — PHYSICAL EXAM
[Slightly Antalgic] : slightly antalgic [Wheelchair] : uses a wheelchair [LE] : Sensory: Intact in bilateral lower extremities [DP] : dorsalis pedis 2+ and symmetric bilaterally [PT] : posterior tibial 2+ and symmetric bilaterally [Normal] : Alert and in no acute distress [Poor Appearance] : well-appearing [Acute Distress] : not in acute distress [de-identified] : The patient has no respiratory distress. Mood and affect are normal. The patient is alert and oriented to person, place and time.\par There is no pain with knee motion.  Examination of the left foot and ankle demonstrates no tenderness of the ankle.  There is no tenderness of the left foot.  There is no pain with active motion.  The skin is intact.  There is no lymphedema. [de-identified] : AP, lateral and oblique x-rays of the left foot taken today demonstrate continued healing of the fifth metatarsal fracture.

## 2023-04-24 ENCOUNTER — APPOINTMENT (OUTPATIENT)
Dept: ORTHOPEDIC SURGERY | Facility: CLINIC | Age: 58
End: 2023-04-24
Payer: MEDICAID

## 2023-04-24 VITALS
TEMPERATURE: 97.7 F | WEIGHT: 178 LBS | DIASTOLIC BLOOD PRESSURE: 81 MMHG | HEIGHT: 62 IN | BODY MASS INDEX: 32.76 KG/M2 | SYSTOLIC BLOOD PRESSURE: 135 MMHG | HEART RATE: 98 BPM | OXYGEN SATURATION: 97 %

## 2023-04-24 PROCEDURE — 99024 POSTOP FOLLOW-UP VISIT: CPT

## 2023-04-24 PROCEDURE — 73630 X-RAY EXAM OF FOOT: CPT | Mod: LT

## 2023-04-24 NOTE — HISTORY OF PRESENT ILLNESS
[de-identified] : The patient presents for reevaluation of left fifth metatarsal fracture, 10 weeks from injury. She has transitioned to walking in regular shoes. She has noticed some swelling in her foot and ankle after prolonged periods of walking and standing. She is no longer using a cane for assistance.

## 2023-04-24 NOTE — DISCUSSION/SUMMARY
[de-identified] : The patient's left fifth metatarsal fracture is healing slowly.  She will continue weightbearing to tolerance.  She may use the cane for support.  She is referred for physical therapy for gait training.  I would like to reevaluate her with new x-rays in 1 month.

## 2023-05-11 ENCOUNTER — APPOINTMENT (OUTPATIENT)
Dept: PEDIATRIC MEDICAL GENETICS | Facility: CLINIC | Age: 58
End: 2023-05-11
Payer: MEDICAID

## 2023-05-11 PROCEDURE — 96040: CPT | Mod: 95

## 2023-05-22 ENCOUNTER — APPOINTMENT (OUTPATIENT)
Dept: ORTHOPEDIC SURGERY | Facility: CLINIC | Age: 58
End: 2023-05-22
Payer: MEDICAID

## 2023-05-22 VITALS
SYSTOLIC BLOOD PRESSURE: 126 MMHG | DIASTOLIC BLOOD PRESSURE: 83 MMHG | HEIGHT: 62 IN | BODY MASS INDEX: 32.76 KG/M2 | WEIGHT: 178 LBS | HEART RATE: 97 BPM | OXYGEN SATURATION: 98 % | TEMPERATURE: 97.5 F

## 2023-05-22 PROCEDURE — 73630 X-RAY EXAM OF FOOT: CPT | Mod: LT

## 2023-05-22 PROCEDURE — 99213 OFFICE O/P EST LOW 20 MIN: CPT

## 2023-05-22 NOTE — PHYSICAL EXAM
[Slightly Antalgic] : slightly antalgic [Wheelchair] : uses a wheelchair [LE] : Sensory: Intact in bilateral lower extremities [DP] : dorsalis pedis 2+ and symmetric bilaterally [PT] : posterior tibial 2+ and symmetric bilaterally [Normal] : Alert and in no acute distress [Poor Appearance] : well-appearing [Acute Distress] : not in acute distress [de-identified] : The patient has no respiratory distress. Mood and affect are normal. The patient is alert and oriented to person, place and time.\par There is no pain with knee motion.  Examination of the left foot and ankle demonstrates no tenderness of the ankle.  There is no tenderness of the left foot.  There is no pain with active motion.  The skin is intact.  There is no lymphedema.  She walks with a limp. [de-identified] : AP, lateral and oblique x-rays of the left foot taken today demonstrate continued healing of the fifth metatarsal fracture.  Complete bony union has not yet been attained.  There is still a small lateral area which does not have solid bony union

## 2023-05-22 NOTE — HISTORY OF PRESENT ILLNESS
[de-identified] : The patient presents for reevaluation of left fifth metatarsal fracture, 14 weeks from injury. She has been doing PT 2 x per week with some improvement. She was advised by the therapist to continue ambulation with the cane. She continues to ambulate in regular shoes. She still has swelling in her foot and ankle after prolonged periods of walking and standing.

## 2023-05-22 NOTE — DISCUSSION/SUMMARY
[de-identified] : The patient's left fifth metatarsal fracture is healing.  She does not yet have full bony union.  She may continue with weightbearing to tolerance but should not run.  She will be reevaluated in 2 months.

## 2023-08-04 ENCOUNTER — APPOINTMENT (OUTPATIENT)
Dept: ORTHOPEDIC SURGERY | Facility: CLINIC | Age: 58
End: 2023-08-04
Payer: MEDICAID

## 2023-08-04 VITALS
HEART RATE: 92 BPM | SYSTOLIC BLOOD PRESSURE: 135 MMHG | HEIGHT: 62 IN | TEMPERATURE: 97.7 F | WEIGHT: 190 LBS | DIASTOLIC BLOOD PRESSURE: 81 MMHG | BODY MASS INDEX: 34.96 KG/M2

## 2023-08-04 DIAGNOSIS — S92.353A DISPLACED FRACTURE OF FIFTH METATARSAL BONE, UNSPECIFIED FOOT, INITIAL ENCOUNTER FOR CLOSED FRACTURE: ICD-10-CM

## 2023-08-04 PROCEDURE — 73630 X-RAY EXAM OF FOOT: CPT | Mod: LT

## 2023-08-04 PROCEDURE — 99213 OFFICE O/P EST LOW 20 MIN: CPT

## 2023-08-04 NOTE — DISCUSSION/SUMMARY
[de-identified] : The patient has near complete radiographic union of her left fifth metatarsal fracture.  She may bear weight to tolerance and there is no limit to her walking.  I would have her avoid running for an additional month.  If symptomatic at that time she can be reevaluated.  She would like to complete 1 more month of physical therapy.  She will return as needed.

## 2023-08-04 NOTE — HISTORY OF PRESENT ILLNESS
[de-identified] : The patient presents for reevaluation of left fifth metatarsal fracture, nearly 6 months from injury. She has continued to participate in PT 2 x per week with good relief. She continues to ambulate in regular sneakers. She is has been able to walk longer distances, but is not yet able to run. She still notes some mild swelling and stiffness in her ankle.

## 2023-08-04 NOTE — PHYSICAL EXAM
[Slightly Antalgic] : slightly antalgic [Wheelchair] : uses a wheelchair [LE] : Sensory: Intact in bilateral lower extremities [DP] : dorsalis pedis 2+ and symmetric bilaterally [PT] : posterior tibial 2+ and symmetric bilaterally [Normal] : Alert and in no acute distress [Poor Appearance] : well-appearing [Acute Distress] : not in acute distress [de-identified] : The patient has no respiratory distress. Mood and affect are normal. The patient is alert and oriented to person, place and time. There is no pain with knee motion.  Examination of the left foot and ankle demonstrates no tenderness of the ankle.  There is no tenderness of the left foot.  There is no pain with active motion.  The skin is intact.  There is no lymphedema.   [de-identified] : AP, lateral and oblique x-rays of the left foot taken today demonstrate near complete union of the fifth metatarsal fracture.

## 2023-08-24 ENCOUNTER — APPOINTMENT (OUTPATIENT)
Dept: PEDIATRIC MEDICAL GENETICS | Facility: CLINIC | Age: 58
End: 2023-08-24
Payer: MEDICAID

## 2023-08-24 PROCEDURE — 96040: CPT | Mod: 95

## 2023-11-03 ENCOUNTER — APPOINTMENT (OUTPATIENT)
Dept: ULTRASOUND IMAGING | Facility: CLINIC | Age: 58
End: 2023-11-03
Payer: MEDICAID

## 2023-11-03 ENCOUNTER — OUTPATIENT (OUTPATIENT)
Dept: OUTPATIENT SERVICES | Facility: HOSPITAL | Age: 58
LOS: 1 days | End: 2023-11-03
Payer: MEDICAID

## 2023-11-03 DIAGNOSIS — Z98.891 HISTORY OF UTERINE SCAR FROM PREVIOUS SURGERY: Chronic | ICD-10-CM

## 2023-11-03 DIAGNOSIS — Z84.81 FAMILY HISTORY OF CARRIER OF GENETIC DISEASE: ICD-10-CM

## 2023-11-03 PROCEDURE — 76856 US EXAM PELVIC COMPLETE: CPT

## 2023-11-03 PROCEDURE — 76856 US EXAM PELVIC COMPLETE: CPT | Mod: 26

## 2023-12-18 ENCOUNTER — EMERGENCY (EMERGENCY)
Facility: HOSPITAL | Age: 58
LOS: 1 days | End: 2023-12-18
Attending: EMERGENCY MEDICINE
Payer: MEDICAID

## 2023-12-18 VITALS
OXYGEN SATURATION: 95 % | DIASTOLIC BLOOD PRESSURE: 85 MMHG | TEMPERATURE: 98 F | HEART RATE: 84 BPM | WEIGHT: 184.97 LBS | RESPIRATION RATE: 20 BRPM | HEIGHT: 66 IN | SYSTOLIC BLOOD PRESSURE: 141 MMHG

## 2023-12-18 VITALS
RESPIRATION RATE: 20 BRPM | TEMPERATURE: 98 F | OXYGEN SATURATION: 96 % | DIASTOLIC BLOOD PRESSURE: 61 MMHG | SYSTOLIC BLOOD PRESSURE: 117 MMHG | HEART RATE: 77 BPM

## 2023-12-18 DIAGNOSIS — Z98.891 HISTORY OF UTERINE SCAR FROM PREVIOUS SURGERY: Chronic | ICD-10-CM

## 2023-12-18 LAB
ALBUMIN SERPL ELPH-MCNC: 4.4 G/DL — SIGNIFICANT CHANGE UP (ref 3.3–5)
ALBUMIN SERPL ELPH-MCNC: 4.4 G/DL — SIGNIFICANT CHANGE UP (ref 3.3–5)
ALP SERPL-CCNC: 98 U/L — SIGNIFICANT CHANGE UP (ref 40–120)
ALP SERPL-CCNC: 98 U/L — SIGNIFICANT CHANGE UP (ref 40–120)
ALT FLD-CCNC: 16 U/L — SIGNIFICANT CHANGE UP (ref 10–45)
ALT FLD-CCNC: 16 U/L — SIGNIFICANT CHANGE UP (ref 10–45)
ANION GAP SERPL CALC-SCNC: 11 MMOL/L — SIGNIFICANT CHANGE UP (ref 5–17)
ANION GAP SERPL CALC-SCNC: 11 MMOL/L — SIGNIFICANT CHANGE UP (ref 5–17)
AST SERPL-CCNC: 17 U/L — SIGNIFICANT CHANGE UP (ref 10–40)
AST SERPL-CCNC: 17 U/L — SIGNIFICANT CHANGE UP (ref 10–40)
BASOPHILS # BLD AUTO: 0.05 K/UL — SIGNIFICANT CHANGE UP (ref 0–0.2)
BASOPHILS # BLD AUTO: 0.05 K/UL — SIGNIFICANT CHANGE UP (ref 0–0.2)
BASOPHILS NFR BLD AUTO: 0.6 % — SIGNIFICANT CHANGE UP (ref 0–2)
BASOPHILS NFR BLD AUTO: 0.6 % — SIGNIFICANT CHANGE UP (ref 0–2)
BILIRUB SERPL-MCNC: 0.2 MG/DL — SIGNIFICANT CHANGE UP (ref 0.2–1.2)
BILIRUB SERPL-MCNC: 0.2 MG/DL — SIGNIFICANT CHANGE UP (ref 0.2–1.2)
BUN SERPL-MCNC: 16 MG/DL — SIGNIFICANT CHANGE UP (ref 7–23)
BUN SERPL-MCNC: 16 MG/DL — SIGNIFICANT CHANGE UP (ref 7–23)
CALCIUM SERPL-MCNC: 9.8 MG/DL — SIGNIFICANT CHANGE UP (ref 8.4–10.5)
CALCIUM SERPL-MCNC: 9.8 MG/DL — SIGNIFICANT CHANGE UP (ref 8.4–10.5)
CHLORIDE SERPL-SCNC: 105 MMOL/L — SIGNIFICANT CHANGE UP (ref 96–108)
CHLORIDE SERPL-SCNC: 105 MMOL/L — SIGNIFICANT CHANGE UP (ref 96–108)
CO2 SERPL-SCNC: 23 MMOL/L — SIGNIFICANT CHANGE UP (ref 22–31)
CO2 SERPL-SCNC: 23 MMOL/L — SIGNIFICANT CHANGE UP (ref 22–31)
CREAT SERPL-MCNC: 0.51 MG/DL — SIGNIFICANT CHANGE UP (ref 0.5–1.3)
CREAT SERPL-MCNC: 0.51 MG/DL — SIGNIFICANT CHANGE UP (ref 0.5–1.3)
EGFR: 109 ML/MIN/1.73M2 — SIGNIFICANT CHANGE UP
EGFR: 109 ML/MIN/1.73M2 — SIGNIFICANT CHANGE UP
EOSINOPHIL # BLD AUTO: 0.11 K/UL — SIGNIFICANT CHANGE UP (ref 0–0.5)
EOSINOPHIL # BLD AUTO: 0.11 K/UL — SIGNIFICANT CHANGE UP (ref 0–0.5)
EOSINOPHIL NFR BLD AUTO: 1.3 % — SIGNIFICANT CHANGE UP (ref 0–6)
EOSINOPHIL NFR BLD AUTO: 1.3 % — SIGNIFICANT CHANGE UP (ref 0–6)
FLUAV AG NPH QL: SIGNIFICANT CHANGE UP
FLUAV AG NPH QL: SIGNIFICANT CHANGE UP
FLUBV AG NPH QL: SIGNIFICANT CHANGE UP
FLUBV AG NPH QL: SIGNIFICANT CHANGE UP
GLUCOSE SERPL-MCNC: 106 MG/DL — HIGH (ref 70–99)
GLUCOSE SERPL-MCNC: 106 MG/DL — HIGH (ref 70–99)
HCT VFR BLD CALC: 44.7 % — SIGNIFICANT CHANGE UP (ref 34.5–45)
HCT VFR BLD CALC: 44.7 % — SIGNIFICANT CHANGE UP (ref 34.5–45)
HGB BLD-MCNC: 14.2 G/DL — SIGNIFICANT CHANGE UP (ref 11.5–15.5)
HGB BLD-MCNC: 14.2 G/DL — SIGNIFICANT CHANGE UP (ref 11.5–15.5)
IMM GRANULOCYTES NFR BLD AUTO: 0.3 % — SIGNIFICANT CHANGE UP (ref 0–0.9)
IMM GRANULOCYTES NFR BLD AUTO: 0.3 % — SIGNIFICANT CHANGE UP (ref 0–0.9)
LYMPHOCYTES # BLD AUTO: 1.6 K/UL — SIGNIFICANT CHANGE UP (ref 1–3.3)
LYMPHOCYTES # BLD AUTO: 1.6 K/UL — SIGNIFICANT CHANGE UP (ref 1–3.3)
LYMPHOCYTES # BLD AUTO: 18.4 % — SIGNIFICANT CHANGE UP (ref 13–44)
LYMPHOCYTES # BLD AUTO: 18.4 % — SIGNIFICANT CHANGE UP (ref 13–44)
MCHC RBC-ENTMCNC: 25.2 PG — LOW (ref 27–34)
MCHC RBC-ENTMCNC: 25.2 PG — LOW (ref 27–34)
MCHC RBC-ENTMCNC: 31.8 GM/DL — LOW (ref 32–36)
MCHC RBC-ENTMCNC: 31.8 GM/DL — LOW (ref 32–36)
MCV RBC AUTO: 79.4 FL — LOW (ref 80–100)
MCV RBC AUTO: 79.4 FL — LOW (ref 80–100)
MONOCYTES # BLD AUTO: 0.35 K/UL — SIGNIFICANT CHANGE UP (ref 0–0.9)
MONOCYTES # BLD AUTO: 0.35 K/UL — SIGNIFICANT CHANGE UP (ref 0–0.9)
MONOCYTES NFR BLD AUTO: 4 % — SIGNIFICANT CHANGE UP (ref 2–14)
MONOCYTES NFR BLD AUTO: 4 % — SIGNIFICANT CHANGE UP (ref 2–14)
NEUTROPHILS # BLD AUTO: 6.57 K/UL — SIGNIFICANT CHANGE UP (ref 1.8–7.4)
NEUTROPHILS # BLD AUTO: 6.57 K/UL — SIGNIFICANT CHANGE UP (ref 1.8–7.4)
NEUTROPHILS NFR BLD AUTO: 75.4 % — SIGNIFICANT CHANGE UP (ref 43–77)
NEUTROPHILS NFR BLD AUTO: 75.4 % — SIGNIFICANT CHANGE UP (ref 43–77)
NRBC # BLD: 0 /100 WBCS — SIGNIFICANT CHANGE UP (ref 0–0)
NRBC # BLD: 0 /100 WBCS — SIGNIFICANT CHANGE UP (ref 0–0)
PLATELET # BLD AUTO: 277 K/UL — SIGNIFICANT CHANGE UP (ref 150–400)
PLATELET # BLD AUTO: 277 K/UL — SIGNIFICANT CHANGE UP (ref 150–400)
POTASSIUM SERPL-MCNC: 4.1 MMOL/L — SIGNIFICANT CHANGE UP (ref 3.5–5.3)
POTASSIUM SERPL-MCNC: 4.1 MMOL/L — SIGNIFICANT CHANGE UP (ref 3.5–5.3)
POTASSIUM SERPL-SCNC: 4.1 MMOL/L — SIGNIFICANT CHANGE UP (ref 3.5–5.3)
POTASSIUM SERPL-SCNC: 4.1 MMOL/L — SIGNIFICANT CHANGE UP (ref 3.5–5.3)
PROT SERPL-MCNC: 7.4 G/DL — SIGNIFICANT CHANGE UP (ref 6–8.3)
PROT SERPL-MCNC: 7.4 G/DL — SIGNIFICANT CHANGE UP (ref 6–8.3)
RBC # BLD: 5.63 M/UL — HIGH (ref 3.8–5.2)
RBC # BLD: 5.63 M/UL — HIGH (ref 3.8–5.2)
RBC # FLD: 14.1 % — SIGNIFICANT CHANGE UP (ref 10.3–14.5)
RBC # FLD: 14.1 % — SIGNIFICANT CHANGE UP (ref 10.3–14.5)
RSV RNA NPH QL NAA+NON-PROBE: SIGNIFICANT CHANGE UP
RSV RNA NPH QL NAA+NON-PROBE: SIGNIFICANT CHANGE UP
SARS-COV-2 RNA SPEC QL NAA+PROBE: SIGNIFICANT CHANGE UP
SARS-COV-2 RNA SPEC QL NAA+PROBE: SIGNIFICANT CHANGE UP
SODIUM SERPL-SCNC: 139 MMOL/L — SIGNIFICANT CHANGE UP (ref 135–145)
SODIUM SERPL-SCNC: 139 MMOL/L — SIGNIFICANT CHANGE UP (ref 135–145)
WBC # BLD: 8.71 K/UL — SIGNIFICANT CHANGE UP (ref 3.8–10.5)
WBC # BLD: 8.71 K/UL — SIGNIFICANT CHANGE UP (ref 3.8–10.5)
WBC # FLD AUTO: 8.71 K/UL — SIGNIFICANT CHANGE UP (ref 3.8–10.5)
WBC # FLD AUTO: 8.71 K/UL — SIGNIFICANT CHANGE UP (ref 3.8–10.5)

## 2023-12-18 PROCEDURE — 87637 SARSCOV2&INF A&B&RSV AMP PRB: CPT

## 2023-12-18 PROCEDURE — 96374 THER/PROPH/DIAG INJ IV PUSH: CPT

## 2023-12-18 PROCEDURE — 85025 COMPLETE CBC W/AUTO DIFF WBC: CPT

## 2023-12-18 PROCEDURE — 80053 COMPREHEN METABOLIC PANEL: CPT

## 2023-12-18 PROCEDURE — 93005 ELECTROCARDIOGRAM TRACING: CPT

## 2023-12-18 PROCEDURE — 99284 EMERGENCY DEPT VISIT MOD MDM: CPT | Mod: 25

## 2023-12-18 PROCEDURE — 99284 EMERGENCY DEPT VISIT MOD MDM: CPT

## 2023-12-18 RX ORDER — MECLIZINE HCL 12.5 MG
25 TABLET ORAL ONCE
Refills: 0 | Status: COMPLETED | OUTPATIENT
Start: 2023-12-18 | End: 2023-12-18

## 2023-12-18 RX ORDER — MECLIZINE HCL 12.5 MG
1 TABLET ORAL
Qty: 16 | Refills: 0
Start: 2023-12-18 | End: 2023-12-21

## 2023-12-18 RX ORDER — ONDANSETRON 8 MG/1
4 TABLET, FILM COATED ORAL ONCE
Refills: 0 | Status: COMPLETED | OUTPATIENT
Start: 2023-12-18 | End: 2023-12-18

## 2023-12-18 RX ORDER — SODIUM CHLORIDE 9 MG/ML
1000 INJECTION INTRAMUSCULAR; INTRAVENOUS; SUBCUTANEOUS ONCE
Refills: 0 | Status: COMPLETED | OUTPATIENT
Start: 2023-12-18 | End: 2023-12-18

## 2023-12-18 RX ORDER — MECLIZINE HCL 12.5 MG
1 TABLET ORAL
Qty: 20 | Refills: 0
Start: 2023-12-18 | End: 2023-12-22

## 2023-12-18 RX ADMIN — Medication 25 MILLIGRAM(S): at 09:10

## 2023-12-18 RX ADMIN — SODIUM CHLORIDE 1000 MILLILITER(S): 9 INJECTION INTRAMUSCULAR; INTRAVENOUS; SUBCUTANEOUS at 09:10

## 2023-12-18 RX ADMIN — ONDANSETRON 4 MILLIGRAM(S): 8 TABLET, FILM COATED ORAL at 09:19

## 2023-12-18 NOTE — ED ADULT NURSE REASSESSMENT NOTE - NS ED NURSE REASSESS COMMENT FT1
1100- Ambulated freely from bed to outside of room with steady gait, denies dizziness. Verbalized feeling better & wanting to be released as soon.  at bedisde. Patient tolerated oral fluids & had lunch with no recurrence of vomiting.

## 2023-12-18 NOTE — ED PROVIDER NOTE - PROGRESS NOTE DETAILS
attending Dipak: pt reassessed. Feeling much better, ambulating without any difficulty. Will dc with meclizine and outpatient neuro/ENT follow-up. Strict return precautions discussed at length

## 2023-12-18 NOTE — ED PROVIDER NOTE - OBJECTIVE STATEMENT
58-year-old female PMH of anemia, obesity, prior left ear infections as a child with known chronic left TM perforation, previous vertigo diagnosed 5 years ago presents to the ED complaining of room spinning dizziness which began since last night provoked with change in position or head movement.  Patient reports she experienced an episode last night when going up to use the bathroom, this morning still feeling symptoms with standing up or changing position and reports difficulty walking due to dizziness.  Patient BIBEMS.  Patient reports nausea and vomiting associated with episodes of dizziness, no current dizziness feeling at rest or nausea or vomiting at rest.  Patient denies chest pain, shortness of breath, abdominal pain, fevers, chills, tinnitus, hearing loss.  Patient does report yesterday began experiencing URI symptoms, nasal congestion and rhinorrhea. Pt denies falls, head trauma, AC use.

## 2023-12-18 NOTE — ED PROVIDER NOTE - PHYSICAL EXAMINATION
GEN: Pt in NAD, A&O x3.  PSYCH: Affect appropriate.  EYES: Sclera white w/o injection. PERRL, EOMI, left fastbeat horizontal fatigable nystagmus with left lateral gaze.   ENT: Head NCAT. MMM. EACs clear, right TM pearly grey, serous effusion. left TM pearly grey with known perforation, no DC. Auditory acuity grossly equal to finger rub b/l. + talia-hallpike L side. Neck supple FROM.  RESP: CTA b/l, no wheezes, rales, or rhonchi.   CARDIAC: RRR, clear distinct S1, S2, no appreciable murmurs.  ABD: Abdomen soft, non-tender.  VASC: 2+ radial and dorsalis pedis pulses b/l. No edema or calf tenderness.  NEURO: No focal neuro deficits.  SKIN: No rashes on the trunk.

## 2023-12-18 NOTE — ED PROVIDER NOTE - PATIENT PORTAL LINK FT
You can access the FollowMyHealth Patient Portal offered by [facility] by registering at the following website: [email or web address]. By joining [employer]’s FollowMyHealth portal, you will also be able to view your health information using other applications (apps) compatible with our system. You can access the FollowMyHealth Patient Portal offered by [facility] by registering at the following website: [email or web address]. By joining [employer]’s FollowMyHealth portal, you will also be able to view your health information using other applications (apps) compatible with our system.

## 2023-12-18 NOTE — ED PROVIDER NOTE - ATTENDING APP SHARED VISIT CONTRIBUTION OF CARE
attending Dipak: 58yF h/o anemia, obesity, chronic L TM perforation/scarring, vertigo (diagnosed 5 years ago) p/w room-spinning dizziness since last night, worse with change in position or head movement.  Similar to prior vertigo. Assoc with nausea and vomiting. Feels better when at rest. Does endorse URI symptoms since yesterday. No falls. Exam as above. Likely peripheral vertigo. Will obtain ekg, labs, treat symptomatically with meclizine, viral swab, reassess

## 2023-12-18 NOTE — ED ADULT NURSE NOTE - OBJECTIVE STATEMENT
patient BIBA from home complaining of dizziness from midnight last night & again this morning with vomiting x2. ALert & oriented x4. No neurodeficits noted. Seen & examined by MD. No recurrence of vomiting although still feeling room spinning & head heavy. Denies nausea, fever, numbness, tingling. Hx of vertigo.

## 2023-12-18 NOTE — ED ADULT NURSE NOTE - NSFALLUNIVINTERV_ED_ALL_ED
Bed/Stretcher in lowest position, wheels locked, appropriate side rails in place/Call bell, personal items and telephone in reach/Instruct patient to call for assistance before getting out of bed/chair/stretcher/Non-slip footwear applied when patient is off stretcher/[place] to call system/Physically safe environment - no spills, clutter or unnecessary equipment/Purposeful proactive rounding/Room/bathroom lighting operational, light cord in reach Bed/Stretcher in lowest position, wheels locked, appropriate side rails in place/Call bell, personal items and telephone in reach/Instruct patient to call for assistance before getting out of bed/chair/stretcher/Non-slip footwear applied when patient is off stretcher/[place] to call system/Physically safe environment - no spills, clutter or unnecessary equipment/Purposeful proactive rounding/Room/bathroom lighting operational, light cord in reach

## 2023-12-18 NOTE — ED PROVIDER NOTE - NSFOLLOWUPINSTRUCTIONS_ED_ALL_ED_FT
Follow-up with your primary care provider within 2-3 days.   Additionally follow-up with ENT within 1 week, you have been provided with contact information.    [facility] - ENT  Otolaryngology (ENT)  [address] [place] [address]  Phone: [phone]    Take Meclizine 25mg every 6 hours as needed for vertigo symptoms.     Continue to take all other medications as directed.    Return to the emergency room immediately if your symptoms worsen or persist, or if you have fever, headache, change in vision, numbness, tingling, weakness, difficulty speaking, walking, swallowing, or if any other concerning or questionable symptoms. Follow-up with your primary care provider within 2-3 days.   Additionally follow-up with ENT within 1 week, you have been provided with contact information.    [facility] - ENT  Otolaryngology (ENT)  [address] [place] [address]  Phone: [phone]    Take Meclizine 25mg every 6 hours as needed for vertigo symptoms.     Continue to take all other medications as directed.    Return to the emergency room immediately if your symptoms worsen or persist, or if you have fever, headache, change in vision, numbness, tingling, weakness, difficulty speaking, walking, swallowing, or if any other concerning or questionable symptoms.

## 2023-12-29 ENCOUNTER — APPOINTMENT (OUTPATIENT)
Dept: ULTRASOUND IMAGING | Facility: CLINIC | Age: 58
End: 2023-12-29
Payer: MEDICAID

## 2023-12-29 ENCOUNTER — APPOINTMENT (OUTPATIENT)
Dept: MAMMOGRAPHY | Facility: CLINIC | Age: 58
End: 2023-12-29
Payer: MEDICAID

## 2023-12-29 PROCEDURE — 76641 ULTRASOUND BREAST COMPLETE: CPT | Mod: 50

## 2023-12-29 PROCEDURE — 77063 BREAST TOMOSYNTHESIS BI: CPT

## 2023-12-29 PROCEDURE — 77067 SCR MAMMO BI INCL CAD: CPT

## 2024-01-02 ENCOUNTER — APPOINTMENT (OUTPATIENT)
Dept: OTOLARYNGOLOGY | Facility: CLINIC | Age: 59
End: 2024-01-02
Payer: MEDICAID

## 2024-01-02 ENCOUNTER — NON-APPOINTMENT (OUTPATIENT)
Age: 59
End: 2024-01-02

## 2024-01-02 VITALS
DIASTOLIC BLOOD PRESSURE: 84 MMHG | SYSTOLIC BLOOD PRESSURE: 130 MMHG | HEART RATE: 96 BPM | WEIGHT: 190 LBS | BODY MASS INDEX: 34.96 KG/M2 | HEIGHT: 62 IN

## 2024-01-02 DIAGNOSIS — H90.A32 MIXED CONDUCTIVE AND SENSORINEURAL HEARING, UNILATERAL, LEFT EAR WITH RESTRICTED HEARING ON THE  CONTRALATERAL SIDE: ICD-10-CM

## 2024-01-02 DIAGNOSIS — H90.A21 SENSORINEURAL HEARING LOSS, UNILATERAL, RIGHT EAR, WITH RESTRICTED HEARING ON THE CONTRALATERAL SIDE: ICD-10-CM

## 2024-01-02 DIAGNOSIS — H72.92 UNSPECIFIED PERFORATION OF TYMPANIC MEMBRANE, LEFT EAR: ICD-10-CM

## 2024-01-02 DIAGNOSIS — R42 DIZZINESS AND GIDDINESS: ICD-10-CM

## 2024-01-02 PROCEDURE — 92567 TYMPANOMETRY: CPT

## 2024-01-02 PROCEDURE — 99204 OFFICE O/P NEW MOD 45 MIN: CPT

## 2024-01-02 PROCEDURE — 92557 COMPREHENSIVE HEARING TEST: CPT

## 2024-01-02 RX ORDER — MECLIZINE HYDROCHLORIDE 12.5 MG/1
12.5 TABLET ORAL
Refills: 0 | Status: ACTIVE | COMMUNITY

## 2024-01-02 RX ORDER — MECLIZINE HYDROCHLORIDE 25 MG/1
25 TABLET ORAL
Qty: 90 | Refills: 3 | Status: ACTIVE | COMMUNITY
Start: 2024-01-02 | End: 1900-01-01

## 2024-01-02 NOTE — PHYSICAL EXAM
[Midline] : trachea located in midline position [Normal] : orientation to person, place, and time: normal [de-identified] : clear AD, large post-inf dry perf AS [] : Romberg test is negative [de-identified] : unsteady

## 2024-01-02 NOTE — ASSESSMENT
[FreeTextEntry1] : water precautions rx meclizine   AD WNL sloping to a mod SNHL w type Ad, AS mod/mild to mod-severe MHL w large ECV  MRI unable to do VNG since she can't lie flat and has TM perf f/u after testing is complete

## 2024-01-02 NOTE — HISTORY OF PRESENT ILLNESS
[de-identified] : 58 yr old female awoke in the middle of the night 12/18/2023 w dizzy, N & V ambulance took her to [facility], no imaging was done felt dizzy the next 2d, relieved w meclizine +occ tinnitus AS, known perf AS  had vertigo 2018  +childhood otitis -noise exp, head trauma, FH

## 2024-01-02 NOTE — DATA REVIEWED
[de-identified] :  -TYMPS: TYPE Ad AD, LARGE ECV AS -AD: HEARING WNL .25- 1.5 KHZ SLOPING TO A MODERATE SNHL 2-8 KHZ -AS: ESSENTIALLY MODERATE/ MILD TO MOD-SEVERE MIXED HL .25-8KHZ

## 2024-02-05 ENCOUNTER — NON-APPOINTMENT (OUTPATIENT)
Age: 59
End: 2024-02-05

## 2024-05-27 ENCOUNTER — NON-APPOINTMENT (OUTPATIENT)
Age: 59
End: 2024-05-27

## 2025-02-11 ENCOUNTER — APPOINTMENT (OUTPATIENT)
Dept: ULTRASOUND IMAGING | Facility: CLINIC | Age: 60
End: 2025-02-11

## 2025-02-11 ENCOUNTER — APPOINTMENT (OUTPATIENT)
Dept: MAMMOGRAPHY | Facility: CLINIC | Age: 60
End: 2025-02-11
Payer: MEDICAID

## 2025-02-11 ENCOUNTER — APPOINTMENT (OUTPATIENT)
Dept: ULTRASOUND IMAGING | Facility: CLINIC | Age: 60
End: 2025-02-11
Payer: MEDICAID

## 2025-02-11 PROCEDURE — 77063 BREAST TOMOSYNTHESIS BI: CPT

## 2025-02-11 PROCEDURE — 77067 SCR MAMMO BI INCL CAD: CPT

## 2025-02-11 PROCEDURE — 76641 ULTRASOUND BREAST COMPLETE: CPT | Mod: 50

## 2025-03-03 ENCOUNTER — APPOINTMENT (OUTPATIENT)
Dept: ULTRASOUND IMAGING | Facility: CLINIC | Age: 60
End: 2025-03-03
Payer: MEDICAID

## 2025-03-03 PROCEDURE — 76830 TRANSVAGINAL US NON-OB: CPT

## 2025-03-03 PROCEDURE — 76856 US EXAM PELVIC COMPLETE: CPT

## 2025-03-18 NOTE — ASU DISCHARGE PLAN (ADULT/PEDIATRIC) - "IF YOU OR YOUR GUARDIAN/FAMILY IS A SMOKER, IT IS IMPORTANT FOR YOUR HEALTH TO STOP SMOKING. PLEASE BE AWARE THAT SECOND HAND SMOKE IS ALSO HARMFUL."
Blood work drawn today in the office, venous puncture, right arm, Patient tolerated well.   Statement Selected

## 2025-08-27 ENCOUNTER — NON-APPOINTMENT (OUTPATIENT)
Age: 60
End: 2025-08-27